# Patient Record
Sex: FEMALE | Race: WHITE | NOT HISPANIC OR LATINO | Employment: UNEMPLOYED | URBAN - METROPOLITAN AREA
[De-identification: names, ages, dates, MRNs, and addresses within clinical notes are randomized per-mention and may not be internally consistent; named-entity substitution may affect disease eponyms.]

---

## 2017-05-03 ENCOUNTER — HOSPITAL ENCOUNTER (EMERGENCY)
Facility: MEDICAL CENTER | Age: 22
End: 2017-05-03
Attending: EMERGENCY MEDICINE
Payer: COMMERCIAL

## 2017-05-03 VITALS
HEART RATE: 80 BPM | TEMPERATURE: 98.3 F | BODY MASS INDEX: 37.98 KG/M2 | DIASTOLIC BLOOD PRESSURE: 68 MMHG | HEIGHT: 67 IN | OXYGEN SATURATION: 96 % | WEIGHT: 242 LBS | SYSTOLIC BLOOD PRESSURE: 123 MMHG | RESPIRATION RATE: 18 BRPM

## 2017-05-03 DIAGNOSIS — R55 SYNCOPE, UNSPECIFIED SYNCOPE TYPE: ICD-10-CM

## 2017-05-03 DIAGNOSIS — Z34.91 FIRST TRIMESTER PREGNANCY: ICD-10-CM

## 2017-05-03 DIAGNOSIS — I49.1 ECTOPIC ATRIAL RHYTHM: ICD-10-CM

## 2017-05-03 LAB
ANION GAP SERPL CALC-SCNC: 8 MMOL/L (ref 0–11.9)
APPEARANCE UR: ABNORMAL
BASOPHILS # BLD AUTO: 0.4 % (ref 0–1.8)
BASOPHILS # BLD: 0.03 K/UL (ref 0–0.12)
BUN SERPL-MCNC: 6 MG/DL (ref 8–22)
CALCIUM SERPL-MCNC: 9.7 MG/DL (ref 8.5–10.5)
CHLORIDE SERPL-SCNC: 102 MMOL/L (ref 96–112)
CO2 SERPL-SCNC: 24 MMOL/L (ref 20–33)
COLOR UR AUTO: YELLOW
CREAT SERPL-MCNC: 0.6 MG/DL (ref 0.5–1.4)
EKG IMPRESSION: NORMAL
EOSINOPHIL # BLD AUTO: 0.02 K/UL (ref 0–0.51)
EOSINOPHIL NFR BLD: 0.3 % (ref 0–6.9)
ERYTHROCYTE [DISTWIDTH] IN BLOOD BY AUTOMATED COUNT: 39.7 FL (ref 35.9–50)
GFR SERPL CREATININE-BSD FRML MDRD: >60 ML/MIN/1.73 M 2
GLUCOSE SERPL-MCNC: 84 MG/DL (ref 65–99)
GLUCOSE UR QL STRIP.AUTO: NEGATIVE MG/DL
HCG UR QL: POSITIVE
HCT VFR BLD AUTO: 40.4 % (ref 37–47)
HGB BLD-MCNC: 13.8 G/DL (ref 12–16)
IMM GRANULOCYTES # BLD AUTO: 0.01 K/UL (ref 0–0.11)
IMM GRANULOCYTES NFR BLD AUTO: 0.1 % (ref 0–0.9)
KETONES UR QL STRIP.AUTO: ABNORMAL MG/DL
LEUKOCYTE ESTERASE UR QL STRIP.AUTO: ABNORMAL
LV EJECT FRACT  99904: 65
LV EJECT FRACT MOD 2C 99903: 51.55
LV EJECT FRACT MOD 4C 99902: 60.95
LV EJECT FRACT MOD BP 99901: 56.44
LYMPHOCYTES # BLD AUTO: 1.56 K/UL (ref 1–4.8)
LYMPHOCYTES NFR BLD: 22.7 % (ref 22–41)
MAGNESIUM SERPL-MCNC: 2.1 MG/DL (ref 1.5–2.5)
MCH RBC QN AUTO: 29.5 PG (ref 27–33)
MCHC RBC AUTO-ENTMCNC: 34.2 G/DL (ref 33.6–35)
MCV RBC AUTO: 86.3 FL (ref 81.4–97.8)
MONOCYTES # BLD AUTO: 0.38 K/UL (ref 0–0.85)
MONOCYTES NFR BLD AUTO: 5.5 % (ref 0–13.4)
NEUTROPHILS # BLD AUTO: 4.88 K/UL (ref 2–7.15)
NEUTROPHILS NFR BLD: 71 % (ref 44–72)
NITRITE UR QL STRIP.AUTO: NEGATIVE
NRBC # BLD AUTO: 0.03 K/UL
NRBC BLD AUTO-RTO: 0.4 /100 WBC
PH UR STRIP.AUTO: 8.5 [PH]
PHOSPHATE SERPL-MCNC: 3.9 MG/DL (ref 2.5–4.5)
PLATELET # BLD AUTO: 214 K/UL (ref 164–446)
PMV BLD AUTO: 11.3 FL (ref 9–12.9)
POTASSIUM SERPL-SCNC: 3.7 MMOL/L (ref 3.6–5.5)
PROT UR QL STRIP: NEGATIVE MG/DL
RBC # BLD AUTO: 4.68 M/UL (ref 4.2–5.4)
RBC UR QL AUTO: NEGATIVE
SODIUM SERPL-SCNC: 134 MMOL/L (ref 135–145)
SP GR UR: 1.01
TSH SERPL DL<=0.005 MIU/L-ACNC: 1.09 UIU/ML (ref 0.3–3.7)
WBC # BLD AUTO: 6.9 K/UL (ref 4.8–10.8)

## 2017-05-03 PROCEDURE — 83735 ASSAY OF MAGNESIUM: CPT

## 2017-05-03 PROCEDURE — 99285 EMERGENCY DEPT VISIT HI MDM: CPT

## 2017-05-03 PROCEDURE — 93306 TTE W/DOPPLER COMPLETE: CPT | Mod: 26 | Performed by: INTERNAL MEDICINE

## 2017-05-03 PROCEDURE — 81002 URINALYSIS NONAUTO W/O SCOPE: CPT

## 2017-05-03 PROCEDURE — 36415 COLL VENOUS BLD VENIPUNCTURE: CPT

## 2017-05-03 PROCEDURE — 93306 TTE W/DOPPLER COMPLETE: CPT

## 2017-05-03 PROCEDURE — 81025 URINE PREGNANCY TEST: CPT

## 2017-05-03 PROCEDURE — 93005 ELECTROCARDIOGRAM TRACING: CPT | Performed by: EMERGENCY MEDICINE

## 2017-05-03 PROCEDURE — 80048 BASIC METABOLIC PNL TOTAL CA: CPT

## 2017-05-03 PROCEDURE — 96360 HYDRATION IV INFUSION INIT: CPT | Mod: XU

## 2017-05-03 PROCEDURE — 700105 HCHG RX REV CODE 258: Performed by: EMERGENCY MEDICINE

## 2017-05-03 PROCEDURE — 85025 COMPLETE CBC W/AUTO DIFF WBC: CPT

## 2017-05-03 PROCEDURE — 96361 HYDRATE IV INFUSION ADD-ON: CPT

## 2017-05-03 PROCEDURE — 84100 ASSAY OF PHOSPHORUS: CPT

## 2017-05-03 PROCEDURE — 84443 ASSAY THYROID STIM HORMONE: CPT

## 2017-05-03 RX ORDER — SODIUM CHLORIDE 9 MG/ML
2000 INJECTION, SOLUTION INTRAVENOUS ONCE
Status: COMPLETED | OUTPATIENT
Start: 2017-05-03 | End: 2017-05-03

## 2017-05-03 RX ORDER — ONDANSETRON 4 MG/1
4 TABLET, ORALLY DISINTEGRATING ORAL EVERY 8 HOURS PRN
Qty: 10 TAB | Refills: 0 | Status: SHIPPED | OUTPATIENT
Start: 2017-05-03

## 2017-05-03 RX ORDER — NITROFURANTOIN 25; 75 MG/1; MG/1
100 CAPSULE ORAL 2 TIMES DAILY
Qty: 14 CAP | Refills: 0 | Status: SHIPPED | OUTPATIENT
Start: 2017-05-03 | End: 2017-05-10

## 2017-05-03 RX ORDER — ONDANSETRON 4 MG/1
4 TABLET, FILM COATED ORAL EVERY 4 HOURS PRN
Qty: 20 TAB | Refills: 0 | Status: SHIPPED | OUTPATIENT
Start: 2017-05-03

## 2017-05-03 RX ADMIN — SODIUM CHLORIDE 2000 ML: 9 INJECTION, SOLUTION INTRAVENOUS at 16:09

## 2017-05-03 ASSESSMENT — LIFESTYLE VARIABLES
ON A TYPICAL DAY WHEN YOU DRINK ALCOHOL HOW MANY DRINKS DO YOU HAVE: 2
DO YOU DRINK ALCOHOL: YES
HAVE PEOPLE ANNOYED YOU BY CRITICIZING YOUR DRINKING: NO
AVERAGE NUMBER OF DAYS PER WEEK YOU HAVE A DRINK CONTAINING ALCOHOL: 2
CONSUMPTION TOTAL: NEGATIVE
TOTAL SCORE: 0
EVER HAD A DRINK FIRST THING IN THE MORNING TO STEADY YOUR NERVES TO GET RID OF A HANGOVER: NO
HOW MANY TIMES IN THE PAST YEAR HAVE YOU HAD 5 OR MORE DRINKS IN A DAY: 0
TOTAL SCORE: 0
HAVE YOU EVER FELT YOU SHOULD CUT DOWN ON YOUR DRINKING: NO
EVER FELT BAD OR GUILTY ABOUT YOUR DRINKING: NO
TOTAL SCORE: 0

## 2017-05-03 ASSESSMENT — PAIN SCALES - GENERAL: PAINLEVEL_OUTOF10: 3

## 2017-05-03 NOTE — ED AVS SNAPSHOT
Home Care Instructions                                                                                                                Petra Arredondo   MRN: [id number]    Department:  [facility], Emergency Dept   Date of Visit:  5/3/2017            [facility], Emergency Dept    [address] [address]    Phone:  [phone]      You were seen by     1. Virgen Shepard M.D.    2. Mandy Almonte M.D.      Your Diagnosis Was     Syncope, unspecified syncope type     R55       These are the medications you received during your hospitalization from 05/03/2017 1357 to 05/03/2017 1938     Date/Time Order Dose Route Action    05/03/2017 1609 NS infusion 2,000 mL 2,000 mL Intravenous New Bag      Follow-up Information     1. Follow up with [facility], Emergency Dept.    Specialty:  Emergency Medicine    Why:  As needed, If symptoms worsen    Contact information    [address] [place] [id number] [phone]        2. Follow up with [facility]. Schedule an appointment as soon as possible for a visit in 1 day.    Specialty:  Family Medicine    Contact information    Pending [address] [facility] [address]  [facility] [address]  [phone]        Medication Information     Review all of your home medications and newly ordered medications with your primary doctor and/or pharmacist as soon as possible. Follow medication instructions as directed by your doctor and/or pharmacist.     Please keep your complete medication list with you and share with your physician. Update the information when medications are discontinued, doses are changed, or new medications (including over-the-counter products) are added; and carry medication information at all times in the event of emergency situations.               Medication List      START taking these medications        Instructions    Morning Afternoon Evening Bedtime    nitrofurantoin monohydr macro 100 MG Caps      Commonly known as:  MACROBID        Take 1 Cap by mouth 2 times a day for 7 days.   Dose:  100 mg                        ondansetron 4 MG Tbdp   Commonly known as:  ZOFRAN ODT        Take 1 Tab by mouth every 8 hours as needed for Nausea/Vomiting.   Dose:  4 mg                          ASK your doctor about these medications        Instructions    Morning Afternoon Evening Bedtime    omeprazole 20 MG delayed-release capsule   Commonly known as:  PRILOSEC        Take 1 Cap by mouth every day.   Dose:  20 mg                        potassium chloride SA 10 MEQ Tbcr   Commonly known as:  KLOR-CON M10        Take 1 Tab by mouth 2 times a day.   Dose:  10 mEq                        sucralfate 1 GM Tabs   Commonly known as:  CARAFATE        Take 1 Tab by mouth 4 Times a Day,Before Meals and at Bedtime.   Dose:  1 g                             Where to Get Your Medications      You can get these medications from any pharmacy     Bring a paper prescription for each of these medications    - nitrofurantoin monohydr macro 100 MG Caps  - ondansetron 4 MG Tbdp            Procedures and tests performed during your visit     BASIC METABOLIC PANEL    CBC WITH DIFFERENTIAL    ECHOCARDIOGRAM COMP W/O CONT    EKG (ER)    ESTIMATED GFR    MAGNESIUM    PHOSPHORUS    POC UA    POC URINE PREGNANCY    TSH        Discharge Instructions       Syncope  Syncope is a medical term for fainting or passing out. This means you lose consciousness and drop to the ground. People are generally unconscious for less than 5 minutes. You may have some muscle twitches for up to 15 seconds before waking up and returning to normal. Syncope occurs more often in older adults, but it can happen to anyone. While most causes of syncope are not dangerous, syncope can be a sign of a serious medical problem. It is important to seek medical care.   CAUSES   Syncope is caused by a sudden drop in blood flow to the brain. The specific cause is often not determined.  Factors that can bring on syncope include:  · Taking medicines that lower blood pressure.  · Sudden changes in posture, such as standing up quickly.  · Taking more medicine than prescribed.  · Standing in one place for too long.  · Seizure disorders.  · Dehydration and excessive exposure to heat.  · Low blood sugar (hypoglycemia).  · Straining to have a bowel movement.  · Heart disease, irregular heartbeat, or other circulatory problems.  · Fear, emotional distress, seeing blood, or severe pain.  SYMPTOMS   Right before fainting, you may:  · Feel dizzy or light-headed.  · Feel nauseous.  · See all white or all black in your field of vision.  · Have cold, clammy skin.  DIAGNOSIS   Your health care provider will ask about your symptoms, perform a physical exam, and perform an electrocardiogram (ECG) to record the electrical activity of your heart. Your health care provider may also perform other heart or blood tests to determine the cause of your syncope which may include:  · Transthoracic echocardiogram (TTE). During echocardiography, sound waves are used to evaluate how blood flows through your heart.  · Transesophageal echocardiogram (MICHAEL).  · Cardiac monitoring. This allows your health care provider to monitor your heart rate and rhythm in real time.  · Holter monitor. This is a portable device that records your heartbeat and can help diagnose heart arrhythmias. It allows your health care provider to track your heart activity for several days, if needed.  · Stress tests by exercise or by giving medicine that makes the heart beat faster.  TREATMENT   In most cases, no treatment is needed. Depending on the cause of your syncope, your health care provider may recommend changing or stopping some of your medicines.  HOME CARE INSTRUCTIONS  · Have someone stay with you until you feel stable.  · Do not drive, use machinery, or play sports until your health care provider says it is okay.  · Keep all follow-up appointments  as directed by your health care provider.  · Lie down right away if you start feeling like you might faint. Breathe deeply and steadily. Wait until all the symptoms have passed.  · Drink enough fluids to keep your urine clear or pale yellow.  · If you are taking blood pressure or heart medicine, get up slowly and take several minutes to sit and then stand. This can reduce dizziness.  SEEK IMMEDIATE MEDICAL CARE IF:   · You have a severe headache.  · You have unusual pain in the chest, abdomen, or back.  · You are bleeding from your mouth or rectum, or you have black or tarry stool.  · You have an irregular or very fast heartbeat.  · You have pain with breathing.  · You have repeated fainting or seizure-like jerking during an episode.  · You faint when sitting or lying down.  · You have confusion.  · You have trouble walking.  · You have severe weakness.  · You have vision problems.  If you fainted, call your local emergency services (911 in [place] Do not drive yourself to the hospital.      This information is not intended to replace advice given to you by your health care provider. Make sure you discuss any questions you have with your health care provider.     Document Released: 12/18/2006 Document Revised: 05/03/2016 Document Reviewed: 02/15/2013  [employer] Patient Education ©2016 [employer].    Follow up with your gynecologist. Call them tomorrow. Also follow-up per cardiology's instructions. Go to the ER immediately if he have another episode of passing out or any other concerns.            Patient Information     Patient Information    Following emergency treatment: all patient requiring follow-up care must return either to a private physician or a clinic if your condition worsens before you are able to obtain further medical attention, please return to the emergency room.     Billing Information    At [facility], we work to make the billing process streamlined for our patients.  Our  Representatives are here to answer any questions you may have regarding your hospital bill.  If you have insurance coverage and have supplied your insurance information to us, we will submit a claim to your insurer on your behalf.  Should you have any questions regarding your bill, we can be reached online or by phone as follows:  Online: You are able pay your bills online or live chat with our representatives about any billing questions you may have. We are here to help Monday - Friday from 8:00am to 7:30pm and 9:00am - 12:00pm on Saturdays.  Please visit [email or web address]  for more information.   Phone:  [phone] or [phone]    Please note that your emergency physician, surgeon, pathologist, radiologist, anesthesiologist, and other specialists are not employed by [facility] and will therefore bill separately for their services.  Please contact them directly for any questions concerning their bills at the numbers below:     Emergency Physician Services:  [phone]  [facility]:  [phone]  [facility]:  [phone]  [facility]:  [phone]    1. Your final bill may vary from the amount quoted upon discharge if all procedures are not complete at that time, or if your doctor has additional procedures of which we are not aware. You will receive an additional bill if you return to the Emergency Department at [facility] for suture removal regardless of the facility of which the sutures were placed.     2. Please arrange for settlement of this account at the emergency registration.    3. All self-pay accounts are due in full at the time of treatment.  If you are unable to meet this obligation then payment is expected within 4-5 days.     4. If you have had radiology studies (CT, X-ray, Ultrasound, MRI), you have received a preliminary result during your emergency department visit. Please contact the radiology  department [phone] to receive a copy of your final result. Please discuss the Final result with your primary physician or with the follow up physician provided.     Crisis Hotline:  [place] Crisis Hotline:  0-688-HVDHUVK or 1-728.939.8428  [place] Crisis Hotline:    1-393.826.7999 or 901-645-4615         ED Discharge Follow Up Questions    1. In order to provide you with very good care, we would like to follow up with a phone call in the next few days.  May we have your permission to contact you?     YES /  NO    2. What is the best phone number to call you? (       )_____-__________    3. What is the best time to call you?      Morning  /  Afternoon  /  Evening                   Patient Signature:  ____________________________________________________________    Date:  ____________________________________________________________

## 2017-05-03 NOTE — ED NOTES
"Chief Complaint   Patient presents with   • Syncope   • T-5000 FALL   • Pregnancy     Patient states she is approximately 9 weeks pregnant, she is taking Zofran for morning sickness, states she is getting hot flashes frequently and \"passing out\". Today she states she got hot and fell on her stomach. AAOx4, VSS. Told to inform RN of any changes. Boyfriend with patient.    "

## 2017-05-03 NOTE — ED PROVIDER NOTES
ED Provider Note    CHIEF COMPLAINT  Chief Complaint   Patient presents with   • Syncope   • T-5000 FALL   • Pregnancy       HPI  Petra Arredondo is a 21 y.o. pregnant female at 8-9 weeks who presents complaining of syncope.    Today patient states she was getting out of bed, felt dizzy, and fainted. She denies head trauma or neck pain.    Patient states she has had 6 episodes of syncope during this pregnancy. She has been to the ER 4 times [place] for this issue. She has been told it is related to the pregnancy and dehydration. Patient states prior to syncopal events she feels flushed and very hot.    Patient states she last vomited yesterday but has been vomiting fairly frequently with this pregnancy. She's been taking Zofran to relieve nausea associated with the pregnancy she can stay up on her fluids but states she ran out of her Zofran several days ago.    Patient has a documented IUP per her report.    Patient denies fever, chills, chest pain, palpitations, headache, difficulty breathing, calf pain, history of PE/DVT, dysuria, hematuria, urinary frequency, vaginal bleeding.      ALLERGIES  No Known Allergies    CURRENT MEDICATIONS  Home Medications     Reviewed by Maco England R.N. (Registered Nurse) on 05/03/17 at 1528  Med List Status: Partial    Medication Last Dose Status    omeprazole (PRILOSEC) 20 MG delayed-release capsule not taking Active    potassium chloride SA (KLOR-CON M10) 10 MEQ TBCR prn Active    sucralfate (CARAFATE) 1 GM TABS 8/4/2015 Active                PAST MEDICAL HISTORY   has a past medical history of Scoliosis.    SURGICAL HISTORY  patient denies any surgical history    SOCIAL HISTORY  Social History     Social History Main Topics   • Smoking status: Light Tobacco Smoker -- 5 years     Types: Cigarettes   • Smokeless tobacco: Never Used   • Alcohol Use: Yes      Comment: occassionally   • Drug Use: Yes     Special: Inhaled      Comment: Martin last use 1/5/15   • Sexual  "Activity: Not on file     Lives in [place]  Here visiting her sister    Family Hx:  No history of familial cardiogenic syncope or PE/DVTs      REVIEW OF SYSTEMS  See HPI for further details.  All other systems are negative except as above in HPI.      PHYSICAL EXAM  VITAL SIGNS: /68 mmHg  Pulse 78  Temp(Src) 36.8 °C (98.3 °F) (Temporal)  Resp 18  Ht 1.702 m (5' 7.01\")  Wt 109.77 kg (242 lb)  BMI 37.89 kg/m2  SpO2 99%    General:  WD overweight, nontoxic appearing in NAD; A+Ox3; V/S as above; not tachycardic or hypoxic  Skin: warm and dry; good color; no rash  HEENT: NCAT; EOMs intact; PERRL; no scleral icterus   Neck: FROM; no meningismus, supple  Cardiovascular: Regular heart rate and rhythm.  No murmurs, rubs, or gallops; pulses 2+ bilaterally radially and DP areas  Lungs: Clear to auscultation with good air movement bilaterally.  No wheezes, rhonchi, or rales.   Abdomen: BS present; soft; NTND; no rebound, guarding, or rigidity.  No organomegaly or pulsatile mass; no CVAT   Extremities: BOYER x 4; no e/o trauma; no pedal edema  Neurologic: CNs III-XII grossly intact; speech clear; distal sensation intact; strength 5/5 UE/LEs  Psychiatric: Appropriate affect, normal mood    LABS  Results for orders placed or performed during the hospital encounter of 05/03/17   POC URINE PREGNANCY   Result Value Ref Range    POC Urine Pregnancy Test Positive (A) Negative   POC UA   Result Value Ref Range    POC Color Yellow     POC Appearance Slightly Cloudy (A)     POC Glucose Negative Negative mg/dL    POC Ketones Trace (A) Negative mg/dL    POC Specific Gravity 1.015 1.005-1.030    POC Blood Negative Negative    POC Urine PH 8.5 (A) 5.0-8.0    POC Protein Negative Negative mg/dL    POC Nitrites Negative Negative    POC Leukocyte Esterase Small (A) Negative   EKG (ER)   Result Value Ref Range    Report       [facility]    Test Date:  2017-05-03  Pt Name:    ADE BLUE      "       Department: ER  MRN:        [id number]                      Room:       [facility]  Gender:     F                            Technician: [id number]  :        1995                   Requested By:CYN DEAN  Order #:    [id number]                    Reading MD: CYN DEAN MD    Measurements  Intervals                                Axis  Rate:       84                           P:          -77  NJ:         112                          QRS:        65  QRSD:       80                           T:          44  QT:         376  QTc:        445    Interpretive Statements  ECTOPIC ATRIAL RHYTHM  BORDERLINE SHORT NJ INTERVAL  No previous ECG available for comparison    Electronically Signed On 5-3-2017 16:18:45 PDT by CYN DEAN MD           EKG  12 Lead EKG obtained at 1446 and interpreted by me to show:  Rhythm: ectopic atrial rhythm  Rate: 84  Intervals:   NJ: 112   QRS: 80   QTC: 445   All intervals are within normal limits  Normal axis  No ST changes  Clinical Impression: Ectopic atrial rhythm  No prior EKG  EKG has been confirmed in [facility]     MEDICAL RECORD  I have reviewed patient's medical record and pertinent results are listed below.      COURSE & MEDICAL DECISION MAKING  I have reviewed any medical record information, laboratory studies and radiographic results as noted.    Petra Arredondo is a 21 y.o. female who presents complaining of syncope.    Orthostatics are positive for change with standing. Patient became tachycardic.    EKG demonstrates ectopic atrial rhythm and a borderline short NJ interval    4:19 PM  Paging cardiology    4:49 PM  Dr. Ingram from cardiology will come see the patient. He recommends the patient have a Holter monitor. He is aware the patient lives in [place]. He will discuss this with her.    4:51 PM   I will sign the patient out to Dr. Almonte who will follow up on cardiology recommendations, labs, and patient's symptoms following 2 L of fluid.    FINAL  IMPRESSION  1. Syncope, unspecified syncope type    2. Ectopic atrial rhythm    3. First trimester pregnancy        Electronically signed by: Virgen Shepard, 5/3/2017 4:15 PM

## 2017-05-03 NOTE — ED AVS SNAPSHOT
[employer] Access Code: [email or web address]  Expires: 6/2/2017  6:54 PM    [employer]  A secure, online tool to manage your health information     [employer]’s [employer]® is a secure, online tool that connects you to your personalized health information from the privacy of your home -- day or night - making it very easy for you to manage your healthcare. Once the activation process is completed, you can even access your medical information using the [employer] lorrie, which is available for free in the Apple Lor[employer] store or Google Play store.     [employer] provides the following levels of access (as shown below):   My Chart Features   [facility] Primary Care Doctor [facility]  Specialists [facility]  Urgent  Care Non-[facility]  Primary Care  Doctor   Email your healthcare team securely and privately 24/7 X X X X   Manage appointments: schedule your next appointment; view details of past/upcoming appointments X      Request prescription refills. X      View recent personal medical records, including lab and immunizations X X X X   View health record, including health history, allergies, medications X X X X   Read reports about your outpatient visits, procedures, consult and ER notes X X X X   See your discharge summary, which is a recap of your hospital and/or ER visit that includes your diagnosis, lab results, and care plan. X X       How to register for [employer]  1. Go to  [email or web address].  2. Click on the Sign Up Now box, which takes you to the New Member Sign Up page. You will need to provide the following information:  a. Enter your [employer] Access Code exactly as it appears at the top of this page. (You will not need to use this code after you’ve completed the sign-up process. If you do not sign up before the expiration date, you must request a new code.)   b. Enter your date of birth.   c. Enter your home email address.   d. Click Submit, and follow the next screen’s instructions.  3. Create a [employer] ID. This will be your [employer]  login ID and cannot be changed, so think of one that is secure and easy to remember.  4. Create a [employer] password. You can change your password at any time.  5. Enter your Password Reset Question and Answer. This can be used at a later time if you forget your password.   6. Enter your e-mail address. This allows you to receive e-mail notifications when new information is available in [employer].  7. Click Sign Up. You can now view your health information.    For assistance activating your [employer] account, call [phone]

## 2017-05-03 NOTE — ED AVS SNAPSHOT
5/3/2017    Petra Arredondo  [address]   [facility] [address]    Dear Petra:    [facility] wants to ensure your discharge home is safe and you or your loved ones have had all of your questions answered regarding your care after you leave the hospital.    Below is a list of resources and contact information should you have any questions regarding your hospital stay, follow-up instructions, or active medical symptoms.    Questions or Concerns Regarding… Contact   Medical Questions Related to Your Discharge  (7 days a week, 8am-5pm) Contact a Nurse Care Coordinator   [phone]   Medical Questions Not Related to Your Discharge  (24 hours a day / 7 days a week)  Contact the Nurse Health Line   [phone]    Medications or Discharge Instructions Refer to your discharge packet   or contact your [facility] Primary Care Provider   [phone]   Follow-up Appointment(s) Schedule your appointment via [employer]   or contact Scheduling [phone]   Billing Review your statement via [employer]  or contact Billing [phone]   Medical Records Review your records via [employer]   or contact Medical Records [phone]     You may receive a telephone call within two days of discharge. This call is to make certain you understand your discharge instructions and have the opportunity to have any questions answered. You can also easily access your medical information, test results and upcoming appointments via the [employer] free online health management tool. You can learn more and sign up at [employer]d. For assistance setting up your [employer] account, please call [phone].    Once again, we want to ensure your discharge home is safe and that you have a clear understanding of any next steps in your care. If you have any questions or concerns, please do not hesitate to contact us, we are here for you. Thank you for choosing [facility] for your healthcare needs.    Sincerely,    Your [facility] Healthcare Team

## 2017-05-04 NOTE — DISCHARGE INSTRUCTIONS
Syncope  Syncope is a medical term for fainting or passing out. This means you lose consciousness and drop to the ground. People are generally unconscious for less than 5 minutes. You may have some muscle twitches for up to 15 seconds before waking up and returning to normal. Syncope occurs more often in older adults, but it can happen to anyone. While most causes of syncope are not dangerous, syncope can be a sign of a serious medical problem. It is important to seek medical care.   CAUSES   Syncope is caused by a sudden drop in blood flow to the brain. The specific cause is often not determined. Factors that can bring on syncope include:  · Taking medicines that lower blood pressure.  · Sudden changes in posture, such as standing up quickly.  · Taking more medicine than prescribed.  · Standing in one place for too long.  · Seizure disorders.  · Dehydration and excessive exposure to heat.  · Low blood sugar (hypoglycemia).  · Straining to have a bowel movement.  · Heart disease, irregular heartbeat, or other circulatory problems.  · Fear, emotional distress, seeing blood, or severe pain.  SYMPTOMS   Right before fainting, you may:  · Feel dizzy or light-headed.  · Feel nauseous.  · See all white or all black in your field of vision.  · Have cold, clammy skin.  DIAGNOSIS   Your health care provider will ask about your symptoms, perform a physical exam, and perform an electrocardiogram (ECG) to record the electrical activity of your heart. Your health care provider may also perform other heart or blood tests to determine the cause of your syncope which may include:  · Transthoracic echocardiogram (TTE). During echocardiography, sound waves are used to evaluate how blood flows through your heart.  · Transesophageal echocardiogram (MICHAEL).  · Cardiac monitoring. This allows your health care provider to monitor your heart rate and rhythm in real time.  · Holter monitor. This is a portable device that records your  heartbeat and can help diagnose heart arrhythmias. It allows your health care provider to track your heart activity for several days, if needed.  · Stress tests by exercise or by giving medicine that makes the heart beat faster.  TREATMENT   In most cases, no treatment is needed. Depending on the cause of your syncope, your health care provider may recommend changing or stopping some of your medicines.  HOME CARE INSTRUCTIONS  · Have someone stay with you until you feel stable.  · Do not drive, use machinery, or play sports until your health care provider says it is okay.  · Keep all follow-up appointments as directed by your health care provider.  · Lie down right away if you start feeling like you might faint. Breathe deeply and steadily. Wait until all the symptoms have passed.  · Drink enough fluids to keep your urine clear or pale yellow.  · If you are taking blood pressure or heart medicine, get up slowly and take several minutes to sit and then stand. This can reduce dizziness.  SEEK IMMEDIATE MEDICAL CARE IF:   · You have a severe headache.  · You have unusual pain in the chest, abdomen, or back.  · You are bleeding from your mouth or rectum, or you have black or tarry stool.  · You have an irregular or very fast heartbeat.  · You have pain with breathing.  · You have repeated fainting or seizure-like jerking during an episode.  · You faint when sitting or lying down.  · You have confusion.  · You have trouble walking.  · You have severe weakness.  · You have vision problems.  If you fainted, call your local emergency services (911 in [place] Do not drive yourself to the hospital.      This information is not intended to replace advice given to you by your health care provider. Make sure you discuss any questions you have with your health care provider.     Document Released: 12/18/2006 Document Revised: 05/03/2016 Document Reviewed: 02/15/2013  Elsevier Interactive Patient Education ©2016 [employer]  [employer]    Follow up with your gynecologist. Call them tomorrow. Also follow-up per cardiology's instructions. Go to the ER immediately if he have another episode of passing out or any other concerns.

## 2017-05-04 NOTE — CONSULTS
DATE OF SERVICE:  05/03/2017    DATE OF CONSULTATION:  05/03/2017    REASON FOR CONSULTATION:  I was asked by Dr. Virgen Shepard of the emergency   staff to evaluate this patient who presents with syncope with prior history of   syncope.  She is also pregnant and with some abnormal EKG.    CHIEF COMPLAINT:  Syncope.    HISTORY OF PRESENT ILLNESS:  This is a 21-year-old woman in her second   pregnancy and possibly is 8-9 weeks, but that is unclear based on dates and   prior imaging apparently at [facility], who has recurrent syncope in the setting   of pregnancy.  On multiple occasions, she has had significant nausea and   morning sickness and was having vomiting spells with subsequent syncope.  She   presented to a couple of different emergency rooms down in [place] where   she typically lives, although she may be moving to [place] and there, she was   given Zofran and encouraged hydration.  It was felt that her syncope was   related to dehydration.  She had been doing relatively well but again, today   woke up and had some nausea, was going to the bathroom and then fell over.    Her significant other witnessed this.  She had no seizure activity and came to   quickly, but felt that she just had true syncope.    PAST MEDICAL HISTORY:  Scoliosis.    PAST SURGICAL HISTORY:  None.    ALLERGIES:  No known drug allergies.    FAMILY HISTORY:  No history of syncope or clotting disorder.    HOME MEDICATIONS:  Prenatal, Zofran as necessary.    SOCIAL HISTORY:  She is a nonsmoker.  No significant alcohol or drug use.    Lives with her significant other, is planning on moving back to [place] to be   closer to her sister and that is also where she had her last child in 2014.    REVIEW OF SYSTEMS:  Otherwise negative except as mentioned pertinent positive   and negatives in HPI.    PHYSICAL EXAMINATION:  VITAL SIGNS:  She is afebrile, heart rates in the 70s, blood pressure is   normotensive to mildly orthostatic here.  Her stated  weight 242 pounds, BMI of   38, again with unknown weeks of pregnancy.  HEENT:  Eyes are anicteric.  Mouth is moist.  NECK:  Supple.  CHEST:  Clear to auscultation.  HEART:  Regular rate and rhythm.  S1, S2 without significant murmurs, rubs, or   gallops.  ABDOMEN:  Gravid, soft, nontender, benign.  EXTREMITIES:  Warm and well perfused, 2+ pulses throughout.  NEUROLOGIC:  Grossly intact.  SKIN:  Grossly intact.    DIAGNOSTIC DATA:  Her EKG shows ectopic atrial rhythm with normal axis, rate   was 84.  No prior EKGs available.  There is no reported EKG on her prior   emergency room visits from [facility].    LABORATORY DATA:  Her CBC is normal.  Electrolytes within normal range.  UA   has small leukocyte esterase.  Her urine positive for BCG obviously.    A bedside echocardiogram shows normal structure and function.    IMPRESSION:  1.  Recurrent syncope, likely due to dehydration.  2.  Ectopic atrial rhythm, newly diagnosed.  3.  Pregnancy.    RECOMMENDATIONS:  It is my pleasure to see the patient who is accompanied by   her significant other here in the emergency room.  For recurrent syncope, now   this has occurred many times and it would be worthwhile to do more   investigation into her heart rhythm.  Her echo is structurally normal, which   is reassuring, but I advised her to get a 48-hour Holter monitor and if this   were negative and having recurrent syncope, perhaps consider a ZIP patch   monitor.  Overall, her symptoms seem most consistent with dehydration and   pregnancy and likely this time, she has positive UA, so may have urinary tract   infection, which will likely be treated.  I encouraged hydration and   establish with cardiology in [place] if she is going to stay in that area.    Otherwise, if she does move to [place], _____ we can arrange the above testing.    I wrote down the testing required and encouraged her to follow up for any   concerns and so she can establish with her providers in [place] or  again if   she moves here, I would be happy to see her in cardiology clinic here.    Thank you for the opportunity to participate in her care.  Please do not   hesitate to contact me with questions or concerns.  She can safely be   discharged from the emergency room.       ____________________________________     MD KYMBERLY Fields / CESAR    DD:  05/03/2017 17:34:50  DT:  05/03/2017 20:10:15    D#:  [id number]  Job#:  [id number]

## 2017-05-04 NOTE — ED NOTES
Pt AOx4.  Pt given discharge instructions and pt verbalized understanding.  Pt ambulated to [facility].

## 2017-05-04 NOTE — CONSULTS
[id number]    Recurrent syncope   Ectopic atrial rhythm    Syncope likely vagal from emesis or dehydration with possible UTI    Given abnormal EKG but normal echo advised her to get a 48 holter and if recurrent syncope Zio patch    encouraged to establish with cardiology in [place] or if she moves here can see us    Safe for dc    It is my pleasure to participate in the care of Ms. Arredondo.  Please do not hesitate to contact me with questions or concerns.    Tino Ingram MD PhD FACC  Cardiologist [facility]

## 2017-05-04 NOTE — ED PROVIDER NOTES
ED PROVIDER NOTE    Scribed for Mandy Almonte M.D. by Ruth Ross. 5/3/2017, 4:30 PM.    This is an addendum to the note on Petra Arredondo. For further details and full chart entry, see the previously signed ED Provider Note written by Dr. Shepard ([facility]).      4:30 PM - I discussed the patient's case with Dr. Shepard (ERP) who will transfer care of the patient to me at this time.        6:11 PM - I reviewed the patient's labs and the patient has trace leukocyte esterase, will treat for possible UTI. Cardiology has seen her and she understands her follow up plan. Patient has ambulated without dizziness; therefore, after her second liter of fluid she will be discharged. Patient understands return precautions.    The patient will return for new or worsening symptoms and is stable at the time of discharge.    The patient is referred to a primary physician for blood pressure management, diabetic screening, and for all other preventative health concerns.    DISPOSITION:  Patient will be discharged home in stable condition.    FOLLOW UP:  [facility], Emergency Dept  [address] [address] [phone]    As needed, If symptoms worsen    [address]  [address]  [place] [address]  [phone]    Schedule an appointment as soon as possible for a visit in 1 day        OUTPATIENT MEDICATIONS:  New Prescriptions    NITROFURANTOIN MONOHYDR MACRO (MACROBID) 100 MG CAP    Take 1 Cap by mouth 2 times a day for 7 days.    ONDANSETRON (ZOFRAN ODT) 4 MG TABLET DISPERSIBLE    Take 1 Tab by mouth every 8 hours as needed for Nausea/Vomiting.       FINAL IMPRESSION   1. Syncope, unspecified syncope type    2. Ectopic atrial rhythm    3. First trimester pregnancy        Ruth JAMESON (Estella), am scribing for, and in the presence of, Mandy Almonte M.D..    Electronically signed by: Ruth Noyola), 5/3/2017    Mandy JAMESON M.D. personally performed the services  described in this documentation, as scribed by Ruth Ross in my presence, and it is both accurate and complete.    The note accurately reflects work and decisions made by me.  Mandy Almonte  5/4/2017  1:36 AM